# Patient Record
Sex: FEMALE | Race: WHITE | NOT HISPANIC OR LATINO
[De-identification: names, ages, dates, MRNs, and addresses within clinical notes are randomized per-mention and may not be internally consistent; named-entity substitution may affect disease eponyms.]

---

## 2019-02-14 ENCOUNTER — APPOINTMENT (OUTPATIENT)
Dept: OTOLARYNGOLOGY | Facility: CLINIC | Age: 39
End: 2019-02-14
Payer: COMMERCIAL

## 2019-02-14 VITALS
BODY MASS INDEX: 22.2 KG/M2 | HEART RATE: 67 BPM | RESPIRATION RATE: 17 BRPM | DIASTOLIC BLOOD PRESSURE: 82 MMHG | OXYGEN SATURATION: 99 % | TEMPERATURE: 98 F | WEIGHT: 130 LBS | HEIGHT: 64 IN | SYSTOLIC BLOOD PRESSURE: 160 MMHG

## 2019-02-14 DIAGNOSIS — H60.331 SWIMMER'S EAR, RIGHT EAR: ICD-10-CM

## 2019-02-14 DIAGNOSIS — T16.1XXA FOREIGN BODY IN RIGHT EAR, INITIAL ENCOUNTER: ICD-10-CM

## 2019-02-14 PROCEDURE — 69200 CLEAR OUTER EAR CANAL: CPT | Mod: RT

## 2019-02-14 PROCEDURE — 99213 OFFICE O/P EST LOW 20 MIN: CPT | Mod: 25

## 2019-02-14 RX ORDER — OFLOXACIN OTIC 3 MG/ML
0.3 SOLUTION AURICULAR (OTIC) TWICE DAILY
Qty: 1 | Refills: 1 | Status: ACTIVE | COMMUNITY
Start: 2019-02-14 | End: 1900-01-01

## 2019-02-14 RX ORDER — FROVATRIPTAN SUCCINATE 2.5 MG/1
2.5 TABLET, FILM COATED ORAL
Refills: 0 | Status: ACTIVE | COMMUNITY

## 2019-02-14 RX ORDER — NAPROXEN SODIUM 220 MG
TABLET ORAL
Refills: 0 | Status: ACTIVE | COMMUNITY

## 2019-02-14 RX ORDER — LEVOTHYROXINE SODIUM 75 UG/1
75 TABLET ORAL
Refills: 0 | Status: ACTIVE | COMMUNITY

## 2019-02-14 NOTE — HISTORY OF PRESENT ILLNESS
[de-identified] : 39 yo woman with 3 week h/o progressive r otalgia and sensation that there is fluid in her ear, associated with hearing loss. she denies tinnitus or vertigo.has mild pain and tenderness. no antecedent uri. it is 3/10 in severity but persistent -f/s/c no fh relevant to cc

## 2019-02-14 NOTE — PHYSICAL EXAM
[de-identified] : l ok; r fb in eac - removed - ear canal inflamed. [Normal] : no masses and lesions seen, face is symmetric

## 2019-02-14 NOTE — PROCEDURE
[Same] : same as the Pre Op Dx. [] : Removal of FB: Ear Canal [FreeTextEntry6] : r eear bud removed from eac\par she rteported immediate relief\par o,e\par

## 2019-10-17 ENCOUNTER — APPOINTMENT (OUTPATIENT)
Dept: OTOLARYNGOLOGY | Facility: CLINIC | Age: 39
End: 2019-10-17
Payer: COMMERCIAL

## 2019-10-17 VITALS
SYSTOLIC BLOOD PRESSURE: 142 MMHG | DIASTOLIC BLOOD PRESSURE: 95 MMHG | TEMPERATURE: 98.1 F | HEART RATE: 87 BPM | OXYGEN SATURATION: 100 %

## 2019-10-17 PROCEDURE — 31231 NASAL ENDOSCOPY DX: CPT

## 2019-10-17 PROCEDURE — 99214 OFFICE O/P EST MOD 30 MIN: CPT | Mod: 25

## 2019-10-17 RX ORDER — AZITHROMYCIN 250 MG/1
250 TABLET, FILM COATED ORAL
Qty: 1 | Refills: 0 | Status: ACTIVE | COMMUNITY
Start: 2019-10-17 | End: 1900-01-01

## 2019-10-18 NOTE — PHYSICAL EXAM
[Nasal Endoscopy Performed] : nasal endoscopy was performed, see procedure section for findings [Midline] : trachea located in midline position [Normal] : no rashes [de-identified] : l>r [de-identified] : engorged

## 2019-10-18 NOTE — HISTORY OF PRESENT ILLNESS
[de-identified] : 40 yo woman with 3 weeks of nasal congestion uri sx yellow b drainage facial pressure also has l ear discomfort for 3 months radiating onto face. she grinds her teeth and uses . grinds on the guard. Nonsmoker. -f/s/c denies hl but has some distortion\par

## 2019-10-18 NOTE — PROCEDURE
[Osteomeatal Pathology] : osteomeatal pathology [Anterior rhinoscopy insufficient to account for symptoms] : anterior rhinoscopy insufficient to account for symptoms [Topical Lidocaine] : topical lidocaine [Oxymetazoline HCl] : oxymetazoline HCl [Flexible Endoscope] : examined with the flexible endoscope [Serial Number: ___] : Serial Number: [unfilled] [Paranasal Sinuses Middle Meatus] : bilateral purulence [Normal] : the paranasal sinuses had no abnormalities [FreeTextEntry6] : b yellow  omu drainage

## 2019-10-18 NOTE — PHYSICAL EXAM
[Nasal Endoscopy Performed] : nasal endoscopy was performed, see procedure section for findings [Midline] : trachea located in midline position [Normal] : no rashes [de-identified] : l>r [de-identified] : engorged

## 2019-10-18 NOTE — REASON FOR VISIT
[Subsequent Evaluation] : a subsequent evaluation for [FreeTextEntry2] : left otalgia distortion of sound and uri sx for 2-3 weeks asociated with nasal drainage of purulent matl

## 2019-10-18 NOTE — HISTORY OF PRESENT ILLNESS
[de-identified] : 38 yo woman with 3 weeks of nasal congestion uri sx yellow b drainage facial pressure also has l ear discomfort for 3 months radiating onto face. she grinds her teeth and uses . grinds on the guard. Nonsmoker. -f/s/c denies hl but has some distortion\par

## 2019-10-23 ENCOUNTER — APPOINTMENT (OUTPATIENT)
Dept: OTOLARYNGOLOGY | Facility: CLINIC | Age: 39
End: 2019-10-23
Payer: COMMERCIAL

## 2019-10-23 DIAGNOSIS — J32.2 CHRONIC ETHMOIDAL SINUSITIS: ICD-10-CM

## 2019-10-23 PROCEDURE — 99214 OFFICE O/P EST MOD 30 MIN: CPT

## 2019-10-23 RX ORDER — SULFAMETHOXAZOLE AND TRIMETHOPRIM 800; 160 MG/1; MG/1
800-160 TABLET ORAL TWICE DAILY
Qty: 20 | Refills: 0 | Status: ACTIVE | COMMUNITY
Start: 2019-10-23 | End: 1900-01-01

## 2019-10-24 PROBLEM — J32.2 CHRONIC ETHMOIDAL SINUSITIS: Status: ACTIVE | Noted: 2019-10-17

## 2019-10-24 NOTE — HISTORY OF PRESENT ILLNESS
[de-identified] : followup 38 yo woman with sinusitis and left otalgia - she grinds her teeth and uses . Had zpack and sinusitis feels resolved. had ct and is here to review. -f.s.c or drainage. has had this for months. nonsmoker.

## 2019-10-24 NOTE — REASON FOR VISIT
[Subsequent Evaluation] : a subsequent evaluation for [FreeTextEntry2] : followup sinusitis and left otalgia

## 2021-02-23 ENCOUNTER — TRANSCRIPTION ENCOUNTER (OUTPATIENT)
Age: 41
End: 2021-02-23

## 2021-02-24 ENCOUNTER — RESULT REVIEW (OUTPATIENT)
Age: 41
End: 2021-02-24

## 2021-02-24 ENCOUNTER — OUTPATIENT (OUTPATIENT)
Dept: OUTPATIENT SERVICES | Facility: HOSPITAL | Age: 41
LOS: 1 days | Discharge: HOME | End: 2021-02-24
Payer: COMMERCIAL

## 2021-02-24 PROCEDURE — 88304 TISSUE EXAM BY PATHOLOGIST: CPT | Mod: 26

## 2021-03-02 LAB — SURGICAL PATHOLOGY STUDY: SIGNIFICANT CHANGE UP

## 2021-11-03 ENCOUNTER — NON-APPOINTMENT (OUTPATIENT)
Age: 41
End: 2021-11-03

## 2021-11-03 ENCOUNTER — APPOINTMENT (OUTPATIENT)
Dept: ENDOCRINOLOGY | Facility: CLINIC | Age: 41
End: 2021-11-03
Payer: COMMERCIAL

## 2021-11-03 VITALS
DIASTOLIC BLOOD PRESSURE: 99 MMHG | WEIGHT: 145 LBS | BODY MASS INDEX: 24.75 KG/M2 | HEIGHT: 64 IN | HEART RATE: 111 BPM | SYSTOLIC BLOOD PRESSURE: 153 MMHG

## 2021-11-03 DIAGNOSIS — L70.9 ACNE, UNSPECIFIED: ICD-10-CM

## 2021-11-03 DIAGNOSIS — Z79.899 OTHER LONG TERM (CURRENT) DRUG THERAPY: ICD-10-CM

## 2021-11-03 DIAGNOSIS — R53.82 CHRONIC FATIGUE, UNSPECIFIED: ICD-10-CM

## 2021-11-03 PROCEDURE — 99205 OFFICE O/P NEW HI 60 MIN: CPT

## 2023-04-13 ENCOUNTER — APPOINTMENT (OUTPATIENT)
Dept: OTOLARYNGOLOGY | Facility: CLINIC | Age: 43
End: 2023-04-13
Payer: COMMERCIAL

## 2023-04-13 VITALS
HEIGHT: 64 IN | SYSTOLIC BLOOD PRESSURE: 123 MMHG | BODY MASS INDEX: 22.2 KG/M2 | HEART RATE: 95 BPM | TEMPERATURE: 98 F | OXYGEN SATURATION: 100 % | DIASTOLIC BLOOD PRESSURE: 87 MMHG | WEIGHT: 130 LBS

## 2023-04-13 DIAGNOSIS — H92.02 OTALGIA, LEFT EAR: ICD-10-CM

## 2023-04-13 DIAGNOSIS — J34.2 DEVIATED NASAL SEPTUM: ICD-10-CM

## 2023-04-13 PROCEDURE — 92550 TYMPANOMETRY & REFLEX THRESH: CPT | Mod: 52

## 2023-04-13 PROCEDURE — 31575 DIAGNOSTIC LARYNGOSCOPY: CPT

## 2023-04-13 PROCEDURE — 99203 OFFICE O/P NEW LOW 30 MIN: CPT | Mod: 25

## 2023-04-13 PROCEDURE — 92557 COMPREHENSIVE HEARING TEST: CPT

## 2023-04-13 NOTE — PROCEDURE
[Anterior rhinoscopy insufficient to account for symptoms] : anterior rhinoscopy insufficient to account for symptoms [None] : none [Normal] : the paranasal sinuses had no abnormalities [Flexible Endoscope] : examined with the flexible endoscope [Deviated to the Lt] : deviated to the left [Serial Number: ___] : Serial Number: [unfilled] [FreeTextEntry6] : done for l otalgia to r/o npx mass, check omu patency \par found to have l septal deviation, otherwise appears nl

## 2023-04-13 NOTE — REASON FOR VISIT
[Initial Evaluation] : an initial evaluation for [FreeTextEntry2] : otalgia, neck mass, b aural fullness, adriano when running or exercising

## 2023-04-13 NOTE — ASSESSMENT
[FreeTextEntry1] : 1. r neck swelling\par -reassured she is noticing prominent r carotid bulb - it pulsates - she feels this may only be 5 mo old\par -MRI\par 2. l otalgia likely d/t TMJ\par - showed b nl symmetric hearing, b nl tympanograms -results reviewed with pt \par -scope appeared nl\par -recommended Flonase- she reports she had a bad rxn with this- holding off\par -aleve BID x 10 days if pt can tolerate\par -soft diet\par -avoid bruxism and chewing gum\par -followup with dentist for mouth guard - she wears one\par RTC with MRI to review findings

## 2023-04-13 NOTE — HISTORY OF PRESENT ILLNESS
[de-identified] : 41 y/o F last seen in 10/2019 is presenting with intermittent l ear pain for the past 3-6 months. She is also c/o b aural fullness. She reports that her ears get clogged after she eats, after she uses head phones, and after she exercises. She reports that her left ear is more uncomfortable when she is out in cold air. She is also c/o r "swollen" lymph node that she noticed approximately 5 months ago. Insidious onset. She reports that it pulsates. Patient denies fevers, chills, sweats. Nonsmoker. No FH or SH pertinent to cc. She has h/o TMJ and wears a . She has h/o migraines. She denies nasal congestion. She wears mouth guard for tmj.

## 2023-04-13 NOTE — PHYSICAL EXAM
[Midline] : trachea located in midline position [Nasal Endoscopy Performed] : nasal endoscopy was performed, see procedure section for findings [] : septum deviated to the left [Normal] : no nystagmus [de-identified] : prominent r carotid bulb palpated on exam  [de-identified] : prominent r carotid bulb [de-identified] : gait steady

## 2023-04-26 ENCOUNTER — APPOINTMENT (OUTPATIENT)
Dept: OTOLARYNGOLOGY | Facility: CLINIC | Age: 43
End: 2023-04-26
Payer: COMMERCIAL

## 2023-04-26 VITALS
SYSTOLIC BLOOD PRESSURE: 133 MMHG | DIASTOLIC BLOOD PRESSURE: 91 MMHG | HEART RATE: 110 BPM | BODY MASS INDEX: 22.2 KG/M2 | RESPIRATION RATE: 16 BRPM | OXYGEN SATURATION: 98 % | HEIGHT: 64 IN | TEMPERATURE: 97.8 F | WEIGHT: 130 LBS

## 2023-04-26 DIAGNOSIS — R22.1 LOCALIZED SWELLING, MASS AND LUMP, HEAD: ICD-10-CM

## 2023-04-26 DIAGNOSIS — R68.89 OTHER GENERAL SYMPTOMS AND SIGNS: ICD-10-CM

## 2023-04-26 DIAGNOSIS — H92.02 OTALGIA, LEFT EAR: ICD-10-CM

## 2023-04-26 DIAGNOSIS — R22.0 LOCALIZED SWELLING, MASS AND LUMP, HEAD: ICD-10-CM

## 2023-04-26 PROCEDURE — 99213 OFFICE O/P EST LOW 20 MIN: CPT

## 2023-04-26 NOTE — ASSESSMENT
[FreeTextEntry1] : 1. r neck swelling\par -MRI neck showed small <1 cm level 2 lymph nodes likely reactive, mild hypertrophy of palatine tonsils -images and report reviewed with pt \par -reassured\par 2. l otalgia likely d/t TMJ\par -aleve BID x 10 days if pt can tolerate\par -soft diet\par -avoid bruxism and chewing gum\par -followup with dentist for mouth guard- she wears one\par RTC as needed

## 2023-04-26 NOTE — REASON FOR VISIT
[Subsequent Evaluation] : a subsequent evaluation for [FreeTextEntry2] : otalgia, neck mass, b aural fullness, especially when running or exercising

## 2023-04-26 NOTE — HISTORY OF PRESENT ILLNESS
[de-identified] : 13 day followup visit for this 43 y/o F with b aural fullness. She has h/o TMJ and wears a mouth guard. She is c/o r neck swelling which she noticed approximately 5 months ago. She is here to review MRI. She denies recent uri/ flu/ COVID-19 infxn.

## 2023-04-26 NOTE — PHYSICAL EXAM
[Midline] : trachea located in midline position [Normal] : no rashes [de-identified] : b mild TMJ  [de-identified] : gait steady

## 2023-04-26 NOTE — DATA REVIEWED
[de-identified] : MRI neck showed small <1 cm level 2 lymph nodes likely reactive, mild hypertrophy of palatine tonsils -images and report reviewed with pt

## 2025-09-15 ENCOUNTER — NON-APPOINTMENT (OUTPATIENT)
Age: 45
End: 2025-09-15

## 2025-09-16 ENCOUNTER — NON-APPOINTMENT (OUTPATIENT)
Age: 45
End: 2025-09-16

## 2025-09-17 ENCOUNTER — APPOINTMENT (OUTPATIENT)
Dept: OPHTHALMOLOGY | Facility: CLINIC | Age: 45
End: 2025-09-17